# Patient Record
Sex: FEMALE | Race: WHITE | NOT HISPANIC OR LATINO | Employment: OTHER | URBAN - METROPOLITAN AREA
[De-identification: names, ages, dates, MRNs, and addresses within clinical notes are randomized per-mention and may not be internally consistent; named-entity substitution may affect disease eponyms.]

---

## 2023-10-21 ENCOUNTER — HOSPITAL ENCOUNTER (EMERGENCY)
Facility: HOSPITAL | Age: 88
Discharge: HOME | End: 2023-10-21
Attending: EMERGENCY MEDICINE
Payer: MEDICARE

## 2023-10-21 ENCOUNTER — APPOINTMENT (OUTPATIENT)
Dept: RADIOLOGY | Facility: HOSPITAL | Age: 88
End: 2023-10-21
Payer: MEDICARE

## 2023-10-21 VITALS
TEMPERATURE: 97.9 F | RESPIRATION RATE: 16 BRPM | DIASTOLIC BLOOD PRESSURE: 78 MMHG | SYSTOLIC BLOOD PRESSURE: 155 MMHG | BODY MASS INDEX: 21.52 KG/M2 | HEART RATE: 88 BPM | OXYGEN SATURATION: 98 % | HEIGHT: 63 IN | WEIGHT: 121.47 LBS

## 2023-10-21 DIAGNOSIS — I82.422 ACUTE DEEP VEIN THROMBOSIS (DVT) OF ILIAC VEIN OF LEFT LOWER EXTREMITY (MULTI): Primary | ICD-10-CM

## 2023-10-21 PROCEDURE — 99284 EMERGENCY DEPT VISIT MOD MDM: CPT | Mod: 25

## 2023-10-21 PROCEDURE — 2500000001 HC RX 250 WO HCPCS SELF ADMINISTERED DRUGS (ALT 637 FOR MEDICARE OP)

## 2023-10-21 PROCEDURE — 93971 EXTREMITY STUDY: CPT

## 2023-10-21 RX ORDER — APIXABAN 5 MG (74)
5 KIT ORAL 2 TIMES DAILY
Qty: 74 TABLET | Refills: 0 | Status: SHIPPED | OUTPATIENT
Start: 2023-10-21 | End: 2023-10-22 | Stop reason: SDUPTHER

## 2023-10-21 RX ORDER — APIXABAN 5 MG (74)
5 KIT ORAL 2 TIMES DAILY
Qty: 74 TABLET | Refills: 0 | Status: SHIPPED | OUTPATIENT
Start: 2023-10-21 | End: 2023-10-21 | Stop reason: SDUPTHER

## 2023-10-21 RX ADMIN — APIXABAN 10 MG: 5 TABLET, FILM COATED ORAL at 17:21

## 2023-10-21 ASSESSMENT — PAIN DESCRIPTION - ONSET: ONSET: AWAKENED FROM SLEEP

## 2023-10-21 ASSESSMENT — PAIN DESCRIPTION - ORIENTATION: ORIENTATION: RIGHT

## 2023-10-21 ASSESSMENT — PAIN DESCRIPTION - LOCATION: LOCATION: LEG

## 2023-10-21 ASSESSMENT — PAIN DESCRIPTION - DESCRIPTORS: DESCRIPTORS: SORE

## 2023-10-21 ASSESSMENT — PAIN SCALES - GENERAL: PAINLEVEL_OUTOF10: 3

## 2023-10-21 ASSESSMENT — COLUMBIA-SUICIDE SEVERITY RATING SCALE - C-SSRS
6. HAVE YOU EVER DONE ANYTHING, STARTED TO DO ANYTHING, OR PREPARED TO DO ANYTHING TO END YOUR LIFE?: NO
2. HAVE YOU ACTUALLY HAD ANY THOUGHTS OF KILLING YOURSELF?: NO
1. IN THE PAST MONTH, HAVE YOU WISHED YOU WERE DEAD OR WISHED YOU COULD GO TO SLEEP AND NOT WAKE UP?: NO

## 2023-10-21 ASSESSMENT — PAIN DESCRIPTION - FREQUENCY: FREQUENCY: CONSTANT/CONTINUOUS

## 2023-10-21 ASSESSMENT — PAIN DESCRIPTION - PAIN TYPE: TYPE: ACUTE PAIN

## 2023-10-21 ASSESSMENT — PAIN DESCRIPTION - PROGRESSION: CLINICAL_PROGRESSION: NOT CHANGED

## 2023-10-21 ASSESSMENT — PAIN - FUNCTIONAL ASSESSMENT: PAIN_FUNCTIONAL_ASSESSMENT: 0-10

## 2023-10-21 NOTE — ED PROVIDER NOTES
Notified by nursing that patient had called stating that Eliquis was not received that her pharmacy listed in our system so she had requested that it be sent to [facility] which I had resent for patient's convenience and called patient to notify her.  Patient states she will obtain it immediately.     Constantine Hernández MD  10/21/23 1931

## 2023-10-21 NOTE — ED PROVIDER NOTES
HPI   Chief Complaint   Patient presents with    Leg Swelling     This morning I woke up and my rt  leg and was red and swollen no pain in the calf but in the rt thigh I have soreness       Patient is a 89-year-old female presenting to the emergency department for evaluation of right leg swelling.  Patient states swelling started yesterday.  She denies any pain but noticed that her right leg was larger than her left and there is going to urgent care.  Patient was sent here for further evaluation.  Patient denies any chest pain, shortness of breath, fever, chills, nausea, vomiting, abdominal pain, recent travel, recent illness, headaches, numbness, tingling.                          No data recorded                Patient History   No past medical history on file.  No past surgical history on file.  No family history on file.  Social History     Tobacco Use    Smoking status: Not on file    Smokeless tobacco: Not on file   Substance Use Topics    Alcohol use: Not on file    Drug use: Not on file       Physical Exam   ED Triage Vitals [10/21/23 1538]   Temp Heart Rate Resp BP   36.6 °C (97.9 °F) 91 18 (!) 184/98      SpO2 Temp Source Heart Rate Source Patient Position   96 % Oral Monitor Sitting      BP Location FiO2 (%)     Left arm --       Physical Exam  Constitutional:       General: She is not in acute distress.     Appearance: Normal appearance. She is normal weight. She is not ill-appearing or toxic-appearing.   HENT:      Head: Normocephalic and atraumatic.      Nose: Nose normal.      Mouth/Throat:      Mouth: Mucous membranes are moist.   Eyes:      Extraocular Movements: Extraocular movements intact.      Conjunctiva/sclera: Conjunctivae normal.      Pupils: Pupils are equal, round, and reactive to light.   Cardiovascular:      Rate and Rhythm: Normal rate and regular rhythm.      Pulses: Normal pulses.      Heart sounds: Normal heart sounds. No murmur heard.     No friction rub. No gallop.   Pulmonary:       Effort: Pulmonary effort is normal.      Breath sounds: Normal breath sounds. No wheezing, rhonchi or rales.   Abdominal:      Palpations: Abdomen is soft.   Musculoskeletal:         General: Normal range of motion.      Cervical back: Normal range of motion and neck supple.      Right lower leg: Edema (2+ pitting edema on the right compared to the left with no calf tenderness or palpable cords.  Edema extends above the knee.) present.      Comments: 2+ posterior tibial and pedal pulses bilaterally   Skin:     General: Skin is warm and dry.      Comments: Chronic hyperpigmentation likely due to chronic venous stasis   Neurological:      General: No focal deficit present.      Mental Status: She is alert and oriented to person, place, and time.   Psychiatric:         Mood and Affect: Mood normal.         Behavior: Behavior normal.         ED Course & MDM   Diagnoses as of 10/21/23 1723   Acute deep vein thrombosis (DVT) of iliac vein of left lower extremity (CMS/HCC)       Medical Decision Making  The patient was seen in conjunction with the advanced practice provider, and I performed a substantive portion of the visit.  All medical decision making was performed by me.  If applicable, all laboratory studies, radiology, and EKGs were reviewed by me.     History: 89-year-old female presents for 1 day of right lower extremity swelling.  States it is not substantially painful but much more swollen than her left leg.  No trauma or injury.  Went to urgent care and was sent here for ultrasound.  Physical exam:  Constitutional:  Awake, alert, well appearing, nontoxic  HEENT:  Normocephalic, atraumatic  Neck: Trachea midline, no stridor  Respiratory/Chest:  Clear to auscultation bilaterally, no wheezes, rhonchi, or rales  CV:  Regular rate and regular rhythm, no murmurs, gallops, or rubs  Extremities/MSK: Right lower leg with significant 2+ pitting edema compared to left, no calf tenderness or palpable cords, edema extends  to above the level of the knee, no redness or warmth, 2+ DP/PT pulses, chronic hyperpigmentation suggestive of chronic venous stasis, compartments are soft, skin intact    MDM: 89-year-old female presents for atraumatic right leg swelling.  High suspicion for DVT.  No evidence of neuro or arterial compromise, skin or soft tissue infection, septic joint, compartment syndrome among others.  Ultrasound obtained rule out DVT.        Doreen Balbuena MD      Parts of this chart have been completed using voice recognition software. Please excuse any errors of transcription. Despite the medical decision making time stamp above-my medical decision making has taken place during the patient's entire visit. My thought process and reason for plan has been formulated from the time that I saw the patient until the time of disposition and is not specific to one specific moment during their visit and furthermore my MDM encompasses this entire chart and not only this text box.    Patient seen in conjunction with attending physician Dr. Balbuena    HPI: Detailed above.    Exam: A medically appropriate exam performed, outlined above, given the known history and presentation.    History obtained from: Patient    Social Determinants of Health considered during this visit: Lives at home    Labs/Diagnostics:  Lower extremity venous duplex right   Final Result   Nonocclusive echogenic thrombus from the external iliac vein through   the proximal femoral vein.        MACRO:   None.        Signed by: Almaz Lopez 10/21/2023 4:55 PM   Dictation workstation:   [id number]        Medications given during visit: Eliquis    Considerations/further MDM:  Patient is a 89-year-old female presenting to the emergency department for evaluation of right leg swelling.  On physical exam vital signs remarkable for hypertension but otherwise stable and patient is in no acute distress.  She has 2+ pitting edema to the right lower extremity compared to  the left and chronic hyperpigmentation likely due to chronic venous stasis.  Right lower extremity ultrasound ordered and showed nonocclusive echogenic thrombus from the external iliac vein through the proximal femoral vein.  Patient was given Eliquis and advised to follow-up with PCP within the next 1 to 2 days.  She will return to the emergency department any new or worsening symptoms.  Patient is not short of breath therefore low suspicion for pulmonary embolism at this time and no CTA is needed.    Procedure  Procedures     Doreen Balbuena MD  10/21/23 1606     Elizabeth Fang PA-C  10/21/23 3894

## 2023-10-22 RX ORDER — APIXABAN 5 MG (74)
5 KIT ORAL 2 TIMES DAILY
Qty: 74 TABLET | Refills: 0 | Status: SHIPPED | OUTPATIENT
Start: 2023-10-22 | End: 2023-11-21